# Patient Record
Sex: FEMALE | URBAN - METROPOLITAN AREA
[De-identification: names, ages, dates, MRNs, and addresses within clinical notes are randomized per-mention and may not be internally consistent; named-entity substitution may affect disease eponyms.]

---

## 2020-01-29 PROBLEM — 302914006 BARRETT'S ESOPHAGUS: Status: ACTIVE | Noted: 2019-10-24

## 2020-01-29 PROBLEM — 71457002 OROPHARYNGEAL DYSPHAGIA: Status: ACTIVE | Noted: 2019-10-24

## 2021-01-27 ENCOUNTER — OFFICE VISIT (OUTPATIENT)
Dept: URBAN - METROPOLITAN AREA CLINIC 35 | Facility: CLINIC | Age: 81
End: 2021-01-27

## 2021-01-27 VITALS
SYSTOLIC BLOOD PRESSURE: 115 MMHG | TEMPERATURE: 98.6 F | HEART RATE: 74 BPM | DIASTOLIC BLOOD PRESSURE: 78 MMHG | HEIGHT: 60 IN | WEIGHT: 130.6 LBS | BODY MASS INDEX: 25.64 KG/M2 | OXYGEN SATURATION: 96 %

## 2021-01-27 RX ORDER — MULTIVIT-MIN/IRON/FOLIC ACID/K 18-600-40
AS DIRECTED CAPSULE ORAL
Status: ACTIVE | COMMUNITY

## 2021-01-27 RX ORDER — OMEPRAZOLE 20 MG/1
1 CAPSULE CAPSULE, DELAYED RELEASE ORAL ONCE A DAY
Qty: 90 CAPSULE | Refills: 3 | Status: ACTIVE | COMMUNITY

## 2021-01-27 RX ORDER — HYDROXYUREA 500 MG/1
AS DIRECTED CAPSULE ORAL
Status: ACTIVE | COMMUNITY

## 2021-01-27 RX ORDER — SUMATRIPTAN SUCCINATE 50 MG/1
1 TABLET AT LEAST 2 HOURS BETWEEN DOSES AS NEEDED TABLET, FILM COATED ORAL TWICE A DAY
Status: ON HOLD | COMMUNITY

## 2021-01-27 RX ORDER — OMEPRAZOLE 20 MG/1
1 CAPSULE CAPSULE, DELAYED RELEASE ORAL ONCE A DAY
Qty: 90 CAPSULE | Refills: 3 | OUTPATIENT

## 2021-01-27 RX ORDER — MULTIVITAMIN WITH IRON
AS DIRECTED TABLET ORAL
Status: ACTIVE | COMMUNITY

## 2021-01-27 RX ORDER — ESTRADIOL 1 MG/1
1 TABLET TABLET ORAL ONCE A DAY
Qty: 30 | Status: ACTIVE | COMMUNITY

## 2021-01-27 RX ORDER — UBIDECARENONE 30 MG
AS DIRECTED CAPSULE ORAL
Status: ACTIVE | COMMUNITY

## 2021-01-27 NOTE — HPI-MIGRATED HPI
;   ;     Bloating : Admits occasionally bloating. She think it may be from drinking too much coffee.  Last visit (01/29/2020)              Patient presents today for follow up after her EGD.  Since the procedure patient denies dysphagia, globus, changes in appetite, and changes in bowel habits.   Patient states that she feels better since her last visit. She continues to take Omeprazoel 20 mg one tab daily with no issues and she feels like it has helped with her symptoms.   On last visit 10/24/2019, Patient presents today with complaints of bloating off and on for the past 3 years. She admit some lower abdominal "pressure" that'll occur in the AM.  Associated symptoms include mild amounts of gas for the past couple of months and early satiety. She admit symptoms of dyphagia with certain foods like breads and mashed potatoes.   Patient denies nausea/vomiting, globus, sour eructations, indigestion, changes in appetite, coughing, or changes in bowel habits.  Patient states that she has not had any recent changes in her medications.  Denies a family history of esophageal and gastric cancers and/or diseases.   She is currently taking Omeprazole 20 mg one tab daily for the past 5-6 years which does help with relief of acid reflux/heartburn but no relief of the bloating.  Patient saw previous GI Dr. Calloway beginning of 2019 at which time she had a Barium swallow test done that indicated a hiatal hernia and spontaneous gastroesphageal reflux,  otherwise normal.  She had an EGD in 2015 with Dr. 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 Negative for Knox's esophagus.  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  Per Dr. Brandon's notes, she is due for a surveillance EGD in 2018.   OUTSIDE LABS 09/30/2019 Urea breath test - negative CBC normal CMP normal;   Knox's Esophagus : Patient is a 80-year-old  female who presents today for a follow up. Patient javier takes Omeprazole. 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  Patient denies NSAID use, dysphagia, globus, sour eructations, bloating/gas, indigestion, early satiety, changes in appetite, coughing, nausea, vomiting, abdominal/epigastric pain, or changes in bowel habits.  Patient states that they have/have not had any recent changes in their medications.;